# Patient Record
Sex: MALE | Race: WHITE | Employment: FULL TIME
[De-identification: names, ages, dates, MRNs, and addresses within clinical notes are randomized per-mention and may not be internally consistent; named-entity substitution may affect disease eponyms.]

---

## 2020-03-01 ENCOUNTER — HEALTH MAINTENANCE LETTER (OUTPATIENT)
Age: 50
End: 2020-03-01

## 2020-12-14 ENCOUNTER — HEALTH MAINTENANCE LETTER (OUTPATIENT)
Age: 50
End: 2020-12-14

## 2021-03-26 ENCOUNTER — IMMUNIZATION (OUTPATIENT)
Dept: FAMILY MEDICINE | Facility: OTHER | Age: 51
End: 2021-03-26
Payer: COMMERCIAL

## 2021-03-26 PROCEDURE — 91300 PR COVID VAC PFIZER DIL RECON 30 MCG/0.3 ML IM: CPT

## 2021-03-26 PROCEDURE — 0001A PR COVID VAC PFIZER DIL RECON 30 MCG/0.3 ML IM: CPT

## 2021-04-16 ENCOUNTER — IMMUNIZATION (OUTPATIENT)
Dept: FAMILY MEDICINE | Facility: OTHER | Age: 51
End: 2021-04-16
Attending: FAMILY MEDICINE
Payer: COMMERCIAL

## 2021-04-16 PROCEDURE — 91300 PR COVID VAC PFIZER DIL RECON 30 MCG/0.3 ML IM: CPT

## 2021-04-16 PROCEDURE — 0002A PR COVID VAC PFIZER DIL RECON 30 MCG/0.3 ML IM: CPT

## 2021-04-17 ENCOUNTER — HEALTH MAINTENANCE LETTER (OUTPATIENT)
Age: 51
End: 2021-04-17

## 2021-05-30 ENCOUNTER — HOSPITAL ENCOUNTER (EMERGENCY)
Facility: CLINIC | Age: 51
Discharge: HOME | End: 2021-05-30
Attending: EMERGENCY MEDICINE | Admitting: EMERGENCY MEDICINE
Payer: COMMERCIAL

## 2021-05-30 VITALS
BODY MASS INDEX: 26.67 KG/M2 | DIASTOLIC BLOOD PRESSURE: 83 MMHG | OXYGEN SATURATION: 97 % | SYSTOLIC BLOOD PRESSURE: 151 MMHG | HEART RATE: 82 BPM | WEIGHT: 210 LBS | TEMPERATURE: 99 F | RESPIRATION RATE: 22 BRPM

## 2021-05-30 DIAGNOSIS — N10 ACUTE PYELONEPHRITIS: ICD-10-CM

## 2021-05-30 LAB
ALBUMIN SERPL-MCNC: 3.9 G/DL (ref 3.4–5)
ALBUMIN UR-MCNC: NEGATIVE MG/DL
ALP SERPL-CCNC: 98 U/L (ref 40–150)
ALT SERPL W P-5'-P-CCNC: 49 U/L (ref 0–70)
ANION GAP SERPL CALCULATED.3IONS-SCNC: 8 MMOL/L (ref 3–14)
APPEARANCE UR: CLEAR
AST SERPL W P-5'-P-CCNC: 23 U/L (ref 0–45)
BACTERIA #/AREA URNS HPF: ABNORMAL /HPF
BASOPHILS # BLD AUTO: 0.1 10E9/L (ref 0–0.2)
BASOPHILS NFR BLD AUTO: 0.3 %
BILIRUB SERPL-MCNC: 0.8 MG/DL (ref 0.2–1.3)
BILIRUB UR QL STRIP: NEGATIVE
BUN SERPL-MCNC: 18 MG/DL (ref 7–30)
CALCIUM SERPL-MCNC: 9.4 MG/DL (ref 8.5–10.1)
CHLORIDE SERPL-SCNC: 104 MMOL/L (ref 94–109)
CO2 SERPL-SCNC: 25 MMOL/L (ref 20–32)
COLOR UR AUTO: ABNORMAL
CREAT SERPL-MCNC: 1.04 MG/DL (ref 0.66–1.25)
DIFFERENTIAL METHOD BLD: ABNORMAL
EOSINOPHIL # BLD AUTO: 0 10E9/L (ref 0–0.7)
EOSINOPHIL NFR BLD AUTO: 0.1 %
ERYTHROCYTE [DISTWIDTH] IN BLOOD BY AUTOMATED COUNT: 12.2 % (ref 10–15)
GFR SERPL CREATININE-BSD FRML MDRD: 83 ML/MIN/{1.73_M2}
GLUCOSE SERPL-MCNC: 133 MG/DL (ref 70–99)
GLUCOSE UR STRIP-MCNC: NEGATIVE MG/DL
HCT VFR BLD AUTO: 45.3 % (ref 40–53)
HGB BLD-MCNC: 15.2 G/DL (ref 13.3–17.7)
HGB UR QL STRIP: NEGATIVE
IMM GRANULOCYTES # BLD: 0.1 10E9/L (ref 0–0.4)
IMM GRANULOCYTES NFR BLD: 0.3 %
KETONES UR STRIP-MCNC: 20 MG/DL
LEUKOCYTE ESTERASE UR QL STRIP: ABNORMAL
LYMPHOCYTES # BLD AUTO: 2 10E9/L (ref 0.8–5.3)
LYMPHOCYTES NFR BLD AUTO: 11.9 %
MCH RBC QN AUTO: 28.7 PG (ref 26.5–33)
MCHC RBC AUTO-ENTMCNC: 33.6 G/DL (ref 31.5–36.5)
MCV RBC AUTO: 86 FL (ref 78–100)
MONOCYTES # BLD AUTO: 1.2 10E9/L (ref 0–1.3)
MONOCYTES NFR BLD AUTO: 6.8 %
MUCOUS THREADS #/AREA URNS LPF: PRESENT /LPF
NEUTROPHILS # BLD AUTO: 13.8 10E9/L (ref 1.6–8.3)
NEUTROPHILS NFR BLD AUTO: 80.6 %
NITRATE UR QL: POSITIVE
NRBC # BLD AUTO: 0 10*3/UL
NRBC BLD AUTO-RTO: 0 /100
PH UR STRIP: 5.5 PH (ref 5–7)
PLATELET # BLD AUTO: 421 10E9/L (ref 150–450)
POTASSIUM SERPL-SCNC: 3.8 MMOL/L (ref 3.4–5.3)
PROT SERPL-MCNC: 7.9 G/DL (ref 6.8–8.8)
RBC # BLD AUTO: 5.29 10E12/L (ref 4.4–5.9)
RBC #/AREA URNS AUTO: 1 /HPF (ref 0–2)
SODIUM SERPL-SCNC: 137 MMOL/L (ref 133–144)
SOURCE: ABNORMAL
SP GR UR STRIP: 1.02 (ref 1–1.03)
SQUAMOUS #/AREA URNS AUTO: <1 /HPF (ref 0–1)
UROBILINOGEN UR STRIP-MCNC: 0 MG/DL (ref 0–2)
WBC # BLD AUTO: 17.1 10E9/L (ref 4–11)
WBC #/AREA URNS AUTO: 54 /HPF (ref 0–5)

## 2021-05-30 PROCEDURE — 80053 COMPREHEN METABOLIC PANEL: CPT | Performed by: EMERGENCY MEDICINE

## 2021-05-30 PROCEDURE — 81001 URINALYSIS AUTO W/SCOPE: CPT | Performed by: EMERGENCY MEDICINE

## 2021-05-30 PROCEDURE — 87186 SC STD MICRODIL/AGAR DIL: CPT | Performed by: EMERGENCY MEDICINE

## 2021-05-30 PROCEDURE — 85025 COMPLETE CBC W/AUTO DIFF WBC: CPT | Performed by: EMERGENCY MEDICINE

## 2021-05-30 PROCEDURE — 99284 EMERGENCY DEPT VISIT MOD MDM: CPT | Mod: 25

## 2021-05-30 PROCEDURE — 87086 URINE CULTURE/COLONY COUNT: CPT | Performed by: EMERGENCY MEDICINE

## 2021-05-30 PROCEDURE — 87088 URINE BACTERIA CULTURE: CPT | Performed by: EMERGENCY MEDICINE

## 2021-05-30 PROCEDURE — 87040 BLOOD CULTURE FOR BACTERIA: CPT | Performed by: EMERGENCY MEDICINE

## 2021-05-30 PROCEDURE — 250N000011 HC RX IP 250 OP 636: Performed by: EMERGENCY MEDICINE

## 2021-05-30 PROCEDURE — 96365 THER/PROPH/DIAG IV INF INIT: CPT

## 2021-05-30 RX ORDER — CEFDINIR 300 MG/1
300 CAPSULE ORAL 2 TIMES DAILY
Qty: 20 CAPSULE | Refills: 0 | Status: SHIPPED | OUTPATIENT
Start: 2021-05-30 | End: 2021-06-09

## 2021-05-30 RX ORDER — CEFTRIAXONE 2 G/1
2 INJECTION, POWDER, FOR SOLUTION INTRAMUSCULAR; INTRAVENOUS ONCE
Status: COMPLETED | OUTPATIENT
Start: 2021-05-30 | End: 2021-05-30

## 2021-05-30 RX ADMIN — CEFTRIAXONE SODIUM 2 G: 2 INJECTION, POWDER, FOR SOLUTION INTRAMUSCULAR; INTRAVENOUS at 08:39

## 2021-05-30 ASSESSMENT — ENCOUNTER SYMPTOMS
BACK PAIN: 1
DIARRHEA: 1
HEMATURIA: 0
FEVER: 1
ABDOMINAL PAIN: 1
COUGH: 0
APPETITE CHANGE: 1
SHORTNESS OF BREATH: 0
VOMITING: 0

## 2021-05-30 NOTE — ED PROVIDER NOTES
"  History   Chief Complaint:  Dysuria     HPI   Al Coreas is a 50 year old male with history of type II diabetes mellitus and hypertension who presents with dysuria  intermittently for the past couple of months. He mentioned that urinary streams vary in flow. The patient reported having a fever (101 degrees) and \"racing heart\" this morning and took TheraFlu. The patient associated this with a decreased appetite, soreness in his hips and thighs, mild low abdominal pain, and lower back pain. Patient also mentioned he had diarrhea three weeks ago. Denies any vomiting, cough, shortness of breath, rashes, antibiotic use, hematuria. He is on Metformin and Trulicity with blood glucose ranging from 120-140. Patient mentioned prostate related history in his father's side but denies past history of bladder and kidney related issues.  He has never had his prostate evaluated, and does not know if he has BPH.  Last UTI was years ago.  No recent antibiotic use.  Patient is COVID-19 vaccinated.  He denies any other symptoms at this time.    Review of Systems   Constitutional: Positive for appetite change and fever.   Respiratory: Negative for cough and shortness of breath.    Gastrointestinal: Positive for abdominal pain and diarrhea. Negative for vomiting.   Genitourinary: Negative for hematuria.   Musculoskeletal: Positive for back pain.   Skin: Negative for rash.   All other systems reviewed and are negative.    Allergies:  The patient has no known allergies.     Medications:  Aspirin 81 mg  Lipitor  Vitamin B  Amaryl  Metformin  Elavil  Lisinopril  Trulicity  Insulin    Past Medical History:   Diabetes mellitus type II  Hypertension    Past Surgical History:    R Buttock and left medial thigh excision    Family History:    Diabetes  Stroke    Social History:  Patient presents with his wife.    Physical Exam     Patient Vitals for the past 24 hrs:   BP Temp Temp src Pulse Resp SpO2 Weight   05/30/21 0606 (!) 163/102 99 "  F (37.2  C) Oral 124 22 97 % 95.3 kg (210 lb)       Physical Exam  General: Well appearing, nontoxic. Resting comfortably  Head:  Scalp, face, and head appear normal  Eyes:  Pupils are equal, round    Conjunctivae non-injected and sclerae white  ENT:    The external nose is normal    Pinnae are normal  Neck:  Normal range of motion    There is no rigidity noted    Trachea is in the midline  CV:  Tachycardic rate, regular rhythm     Normal S1/S2, no S3/S4    No murmur or rub. Radial pulses 2+ bilaterally.  Resp:  Lungs are clear and equal bilaterally  There is no tachypnea    No increased work of breathing    No rales, wheezing, or rhonchi  GI:  Abdomen is soft, no rigidity or guarding    No distension, or mass. No CVA tenderness    No tenderness or rebound tenderness   MS:  Normal muscular tone    Symmetric motor strength    No lower extremity edema  Skin:  No rash or acute skin lesions noted  Neuro: Awake and alert  Speech is normal and fluent  Moves all extremities spontaneously  Psych:  Normal affect. Appropriate interactions.      Emergency Department Course   Laboratory:  CBC: WBC 17.1 (H), HGB 15.2,   CMP: Glucose: 133 o/w WNL (Creatinine 1.04)   UA with microscopic: Ketone: 20, Nitrite: Positive, Leukocyte Esterase: Large, WBC: 54, Bacteria: Few, Mucous: Present o/w WNL  Urine Culture: pending  Blood culture x2: pending    Emergency Department Course:    Reviewed:  I reviewed nursing notes, vitals, past medical history and care everywhere    Assessments:  0615 I obtained history and examined the patient as noted above.   0852 I rechecked the patient and explained findings.     Interventions:  0839 Rocephin 2g IV    Disposition:  The patient was discharged to home.     Impression & Plan     Medical Decision Making:  Al Coreas is a 50 year old male who presents for evaluation of fever, dysuria and low back and lower abdominal discomfort.  Urinalysis is consistent with a urinary tract infection;  given the systemic symptoms present, signs and symptoms consistent with pyelonephritis.   There is no clinical evidence of perinephric abscess, emphysematous pyelonephritis, ureterolithiasis, appendicitis, colitis, diverticulitis or any intraabdominal catastrophe.  Patient was given dose of antibiotics in ED; see above.  Patient also reports symptoms likely consistent with possible BPH and/or urinary retention.  Bladder scan in the ED showed no significant post void bladder retention. Given his otherwise well appearance, lack of significant co-morbidities and ability to tolerate po, I believe the risk of imminent deterioration is low enough that outpatient management is indicated.  Return if increasing pain, vomiting, fever, or inability to tolerate the oral antibiotic.  Follow up with primary physician is indicated in 2-3. Return precautions were discussed with patient. The patient's questions were answered and the patient was agreeable with discharge. Cefdinir BID x 10 days.    Covid-19  Al Coreas was evaluated during a global COVID-19 pandemic, which necessitated consideration that the patient might be at risk for infection with the SARS-CoV-2 virus that causes COVID-19.   Applicable protocols for evaluation were followed during the patient's care.     Diagnosis:    ICD-10-CM    1. Acute pyelonephritis  N10        Discharge Medications:  New Prescriptions    CEFDINIR (OMNICEF) 300 MG CAPSULE    Take 1 capsule (300 mg) by mouth 2 times daily for 10 days       Scribe Disclosure:  Lizy SOTOMAYOR, am serving as a scribe at 6:15 AM on 5/30/2021 to document services personally performed by Stephen Arceo MD based on my observations and the provider's statements to me.      Stephen Arceo MD  05/30/21 8280

## 2021-05-31 LAB
BACTERIA SPEC CULT: ABNORMAL
Lab: ABNORMAL
SPECIMEN SOURCE: ABNORMAL

## 2021-06-05 LAB
BACTERIA SPEC CULT: NO GROWTH
BACTERIA SPEC CULT: NO GROWTH
SPECIMEN SOURCE: NORMAL
SPECIMEN SOURCE: NORMAL

## 2021-06-08 ENCOUNTER — TELEPHONE (OUTPATIENT)
Dept: EMERGENCY MEDICINE | Facility: CLINIC | Age: 51
End: 2021-06-08

## 2021-08-07 ENCOUNTER — HEALTH MAINTENANCE LETTER (OUTPATIENT)
Age: 51
End: 2021-08-07

## 2021-10-02 ENCOUNTER — HEALTH MAINTENANCE LETTER (OUTPATIENT)
Age: 51
End: 2021-10-02

## 2022-03-19 ENCOUNTER — HEALTH MAINTENANCE LETTER (OUTPATIENT)
Age: 52
End: 2022-03-19

## 2022-05-14 ENCOUNTER — HEALTH MAINTENANCE LETTER (OUTPATIENT)
Age: 52
End: 2022-05-14

## 2022-09-03 ENCOUNTER — HEALTH MAINTENANCE LETTER (OUTPATIENT)
Age: 52
End: 2022-09-03

## 2023-01-14 ENCOUNTER — HEALTH MAINTENANCE LETTER (OUTPATIENT)
Age: 53
End: 2023-01-14

## 2023-06-02 ENCOUNTER — HEALTH MAINTENANCE LETTER (OUTPATIENT)
Age: 53
End: 2023-06-02

## 2023-07-22 ENCOUNTER — HEALTH MAINTENANCE LETTER (OUTPATIENT)
Age: 53
End: 2023-07-22

## 2024-02-17 ENCOUNTER — HEALTH MAINTENANCE LETTER (OUTPATIENT)
Age: 54
End: 2024-02-17

## 2025-03-13 ENCOUNTER — HOSPITAL ENCOUNTER (EMERGENCY)
Facility: CLINIC | Age: 55
Discharge: HOME | End: 2025-03-13
Attending: EMERGENCY MEDICINE
Payer: COMMERCIAL

## 2025-03-13 VITALS
SYSTOLIC BLOOD PRESSURE: 178 MMHG | RESPIRATION RATE: 16 BRPM | TEMPERATURE: 98.2 F | DIASTOLIC BLOOD PRESSURE: 76 MMHG | HEART RATE: 77 BPM | OXYGEN SATURATION: 98 %

## 2025-03-13 DIAGNOSIS — E11.65 TYPE 2 DIABETES MELLITUS WITH HYPERGLYCEMIA, WITH LONG-TERM CURRENT USE OF INSULIN (H): ICD-10-CM

## 2025-03-13 DIAGNOSIS — L02.416 ABSCESS OF LEFT LEG: ICD-10-CM

## 2025-03-13 DIAGNOSIS — Z79.4 TYPE 2 DIABETES MELLITUS WITH HYPERGLYCEMIA, WITH LONG-TERM CURRENT USE OF INSULIN (H): ICD-10-CM

## 2025-03-13 PROCEDURE — 10060 I&D ABSCESS SIMPLE/SINGLE: CPT

## 2025-03-13 PROCEDURE — 99283 EMERGENCY DEPT VISIT LOW MDM: CPT

## 2025-03-13 PROCEDURE — 87070 CULTURE OTHR SPECIMN AEROBIC: CPT | Performed by: EMERGENCY MEDICINE

## 2025-03-13 PROCEDURE — 250N000013 HC RX MED GY IP 250 OP 250 PS 637: Performed by: EMERGENCY MEDICINE

## 2025-03-13 RX ORDER — CEPHALEXIN 500 MG/1
1000 CAPSULE ORAL ONCE
Status: COMPLETED | OUTPATIENT
Start: 2025-03-13 | End: 2025-03-13

## 2025-03-13 RX ADMIN — CEPHALEXIN 1000 MG: 500 CAPSULE ORAL at 11:37

## 2025-03-13 ASSESSMENT — ACTIVITIES OF DAILY LIVING (ADL)
ADLS_ACUITY_SCORE: 41

## 2025-03-13 ASSESSMENT — COLUMBIA-SUICIDE SEVERITY RATING SCALE - C-SSRS
1. IN THE PAST MONTH, HAVE YOU WISHED YOU WERE DEAD OR WISHED YOU COULD GO TO SLEEP AND NOT WAKE UP?: NO
6. HAVE YOU EVER DONE ANYTHING, STARTED TO DO ANYTHING, OR PREPARED TO DO ANYTHING TO END YOUR LIFE?: NO
2. HAVE YOU ACTUALLY HAD ANY THOUGHTS OF KILLING YOURSELF IN THE PAST MONTH?: NO

## 2025-03-13 NOTE — ED PROVIDER NOTES
Emergency Department Note      History of Present Illness     Chief Complaint   Wound Check    HPI   Al Coreas is a 54 year old male with a history of diabetes mellitus presenting with a cyst to the left leg that was first noticed one week ago. Al poked it with a needle this morning, noting it drained some plasma and blood. The size of the wound has improved since draining. He presented to the clinic this morning and was prescribed cephalexin, but he has not picked it up yet. No recent ill symptoms, fever or vomiting. No history of MRSA. Of note, the patient has a history of cysts requiring surgical removal. His BG have been mildly elevated recently, which he has been managing with increased insulin. Last BG check was 140 today. He has a history of DKA, but is not concerned for this today. No polyuria or polydipsia.     Independent Historian   Wife as detailed above.    Review of External Notes   none    Past Medical History     Medical History and Problem List   Abscess of buttock  Diabetes mellitus     Medications   Aspirin 81 mg   Atorvastatin   Glimepiride   Metformin     Surgical History   Left hand surgery     Physical Exam     Patient Vitals for the past 24 hrs:   BP Temp Temp src Pulse Resp SpO2   03/13/25 0947 (!) 178/76 98.2  F (36.8  C) Oral 77 16 98 %     Physical Exam  Nursing note and vitals reviewed.  Constitutional:  Appears well-developed and well-nourished.   HENT:   Head:    Atraumatic.   Eyes:    Pupils are equal, round, and reactive to light.   Neck:    Normal range of motion. Neck supple.      No tracheal deviation present. No thyromegaly present.   Cardiovascular:  Normal rate, regular rhythm, no murmur   Pulmonary/Chest: Breath sounds are clear and equal without wheezes or crackles.  Abdominal:   Soft. Bowel sounds are normal. Exhibits no distension and      no mass. There is no tenderness.      There is no rebound and no guarding.   Musculoskeletal:  Exhibits no edema.    Neurological:   Alert and oriented to person, place, and time.   Skin:    Localized 4.0 cm diameter area of redness, swelling and fluctuants with minimal tenderness over the lateral aspect of the mid thigh without warm. No lymphangitis.      Diagnostics     Lab Results   Labs Ordered and Resulted from Time of ED Arrival to Time of ED Departure - No data to display    Imaging   No orders to display     Independent Interpretation   None    ED Course      Medications Administered   Medications   cephALEXin (KEFLEX) capsule 1,000 mg (1,000 mg Oral $Given 3/13/25 1137)       Procedures   Procedures     Incision and Drainage     Procedure: Incision and Drainage     Consent: Verbal    Indication: Abscess    Location: Extremity, lower left    Size: 4 cm    Ultrasound Guidance: No    Preparation: Alcohol     Anesthesia/Sedation: Bupivacaine with Epinephrine - 0.5%     Procedure Detail:    Aspiration: No  Incision Type: Single straight  Scalpel: 11  Lesion Management: Moderate amount of bloody pus was expressed from the wound which was deloculated and probed.  Wound Management: Packing   Packing: Gauze, 1/2 inch     Patient Status: The patient tolerated the procedure well: Yes. There were no complications.     Discussion of Management   None    ED Course   ED Course as of 03/13/25 1224   Thu Mar 13, 2025   1005 I obtained the history and examined the patient as noted above.      1202 I reentered the room to perform an incision and drainage.      Additional Documentation  None    Medical Decision Making / Diagnosis     CMS Diagnoses: None    MIPS       None    MDM   Al Coreas is a 54 year old male who presents with left leg abscess with history provided above. He has signs of an abscess. I&D performed and successfully expressed purulent drainage; see procedure note.  Packing was placed.  No signs of more severe infection such as sepsis, necrotizing fasciitis or lymphangitis.  Wound cares discussed.  Follow up in  clinic or may return to ED for wound check if cannot arrange.  Antibiotics were prescribed to prevent potential infection.  Warning signs for wound given on discharge instructions and verbally. The patient's BG have been mildly elevated recently since he began with abscess, which he has been treating with increased insulin. No suspicion for DKA during today's visit.  The patient requested a referral to general surgery which I felt was reasonable, so I ordered a surgical referral in [facility].  He was told signs and symptoms of worsening infection to return to the emergency department for.  He was told to remove the packing in 2 days and follow-up with his primary care doctor early next week    Disposition   The patient was discharged.     Diagnosis     ICD-10-CM    1. Abscess of left leg  L02.416 Adult Gen Surg  Referral      2. Type 2 diabetes mellitus with hyperglycemia, with long-term current use of insulin (H)  E11.65     Z79.4          Discharge Medications   New Prescriptions    No medications on file     Scribe Disclosure:  Marilyn SOTOMAYOR, am serving as a scribe at 10:20 AM on 3/13/2025 to document services personally performed by Sandy Jerry MD based on my observations and the provider's statements to me.        Sandy Jerry MD  03/13/25 5056       Sandy Jerry MD  03/13/25 3192

## 2025-03-13 NOTE — ED TRIAGE NOTES
Pt comes in with cyst to back of left leg. States has been there for a week. Tried to drain it on his own. Denies fevers.      Triage Assessment (Adult)       Row Name 03/13/25 0948          Triage Assessment    Airway WDL WDL        Respiratory WDL    Respiratory WDL WDL        Skin Circulation/Temperature WDL    Skin Circulation/Temperature WDL --  wound to back of left leg        Cardiac WDL    Cardiac WDL WDL        Peripheral/Neurovascular WDL    Peripheral Neurovascular WDL WDL        Cognitive/Neuro/Behavioral WDL    Cognitive/Neuro/Behavioral WDL WDL

## 2025-03-15 LAB — BACTERIA ABSC ANAEROBE+AEROBE CULT: NO GROWTH

## 2025-03-30 ENCOUNTER — HEALTH MAINTENANCE LETTER (OUTPATIENT)
Age: 55
End: 2025-03-30

## 2025-06-18 ENCOUNTER — HOSPITAL ENCOUNTER (EMERGENCY)
Facility: CLINIC | Age: 55
Discharge: HOME | End: 2025-06-18
Attending: EMERGENCY MEDICINE | Admitting: EMERGENCY MEDICINE
Payer: COMMERCIAL

## 2025-06-18 VITALS
TEMPERATURE: 97.7 F | HEART RATE: 72 BPM | SYSTOLIC BLOOD PRESSURE: 166 MMHG | DIASTOLIC BLOOD PRESSURE: 91 MMHG | RESPIRATION RATE: 18 BRPM | OXYGEN SATURATION: 98 %

## 2025-06-18 DIAGNOSIS — T14.8XXA FOREIGN BODY IN SKIN: ICD-10-CM

## 2025-06-18 PROCEDURE — 99283 EMERGENCY DEPT VISIT LOW MDM: CPT

## 2025-06-18 ASSESSMENT — ACTIVITIES OF DAILY LIVING (ADL): ADLS_ACUITY_SCORE: 41

## 2025-06-18 NOTE — ED PROVIDER NOTES
Emergency Department Note      History of Present Illness     Chief Complaint   Foreign Body in Arm      HPI   Al Coreas is a 54 year old male with a history of type 2 diabetes mellitus, hypertension, hyperlipidemia, and MORGAN who presents for evaluation for of possible a foreign body in his left upper arm.  The patient uses the Dexcom to measure his blood sugars, and he was placed in the new device in his left upper arm today when he had apparently was not working properly.  He then removed to the device, and he did not see the needle on the device.  He was therefore concerned that the needle was still in his arm, and his wife tried to remove the needle but does not believe that she was able to.  He is therefore unsure if there is still a needle in his arm, as he does not have any significant pain.     Independent Historian   Wife as detailed above.    Review of External Notes   I reviewed the patient's MIIC.    Past Medical History     Medical History and Problem List   Type 2 diabetes mellitus  MORGAN  Hyperlipidemia  Hypertension    Medications   amoxicillin-clavulanate (AUGMENTIN) 875-125 MG per tablet  aspirin 81 MG tablet  atorvastatin (LIPITOR) 40 MG tablet  B Complex Vitamins (B COMPLEX 1 PO)  blood glucose (ACCU-CHEK FASTCLIX) lancing device  blood glucose monitoring (ACCU-CHEK FASTCLIX) lancets  blood glucose monitoring (ACCU-CHEK ANGELES SMARTVIEW) meter device kit  blood glucose test strip  glimepiride (AMARYL) 2 MG tablet  glucose blood VI test strips strip  metFORMIN (GLUCOPHAGE) 1000 MG tablet  Omega-3 Fatty Acids (FISH OIL TRIPLE STRENGTH) 1400 MG CAPS        Surgical History   Past Surgical History:   Procedure Laterality Date    left hand surgery  1994    electrical injury        Physical Exam     Patient Vitals for the past 24 hrs:   BP Temp Temp src Pulse Resp SpO2   06/18/25 1121 (!) 166/91 97.7  F (36.5  C) Temporal 72 18 98 %     Physical Exam  Nursing note and vitals  reviewed.  Constitutional:  Oriented to person, place, and time. Cooperative.   HENT:   Nose:    Nose normal.   Mouth/Throat:   Mucous membranes are normal.   Eyes:    Conjunctivae normal and EOM are normal.      Pupils are equal, round, and reactive to light.   Neck:    Trachea normal.   Cardiovascular:  Normal rate, regular rhythm, normal heart sounds and normal pulses. No murmur heard.  Pulmonary/Chest:  Effort normal and breath sounds normal.   Abdominal:   Soft. Normal appearance and bowel sounds are normal.      There is no tenderness.      There is no rebound and no CVA tenderness.   Musculoskeletal:  Extremities atraumatic x 4.   Neurological:   Alert and oriented to person, place, and time. Normal strength.      No cranial nerve deficit or sensory deficit. GCS eye subscore is 4. GCS verbal subscore is 5. GCS motor subscore is 6.   Skin:    There is a very small pinpoint erythematous lesion to the left upper arm region but no bleeding or foreign body appreciated on exam and no tenderness to this area.   Psychiatric:   Normal mood and affect.     Diagnostics     Lab Results   Labs Ordered and Resulted from Time of ED Arrival to Time of ED Departure - No data to display    Imaging   Humerus XR,  G/E 2 views, left   Final Result   IMPRESSION:       No metallic foreign body is evident. Negative for acute left humerus fracture or dislocation.          Independent Interpretation   I independently interpreted the patient's humerus x-rays, and I do not appreciate any foreign bodies present.    ED Course      Medications Administered   Medications - No data to display    Procedures   Procedures     Discussion of Management   None    ED Course   ED Course as of 06/18/25 1346   Wed Jun 18, 2025   1154 I evaluated the patient and obtained the history as noted above     1213 I rechecked and reupdated the patient.         Additional Documentation  None    Medical Decision Making / Diagnosis     CMS Diagnoses: None    MIPS    None    [facility]   Al Coreas is a 54 year old male who came in concerned that he might have a Dexcom needle still in his left upper arm.  I obtained x-rays, which did not show a foreign body.  The radiologist called me as well, as he does not see a foreign body either.  I reassured the patient that it does not appear that there is a foreign body in his arm, although I also indicated that it is still possible, albeit unlikely, that there could be something still there that we are not seeing on the x-ray.  However I think that is unlikely given the fact that a needle should show up on x-ray.  I recommended that he use warm wet compresses to the area and certainly return if he develops increasing pain, redness, swelling, or other concerns.  If he has any ongoing concerns for a retained foreign body after a few days, he should also seek reevaluation either here or in his clinic.    Disposition   The patient was discharged.     Diagnosis     ICD-10-CM    1. Evaluation for possible foreign body in skin of left upper arm  T14.8XXA            Discharge Medications   Discharge Medication List as of 6/18/2025 12:46 PM            Scribe Disclosure:  I, Kiko Guzmán, am serving as a scribe at 1:45 PM on 6/18/2025 to document services personally performed by No att. providers found based on my observations and the provider's statements to me.        Tong Hamilton MD  06/18/25 9740

## 2025-06-18 NOTE — ED NOTES
Pt to D/C to home.  Pt provided with d/c instructions, including new medications, when medications were last given, and when to take them again.  Pt also informed to f/u with PCP or to come back to the ER as needed.  Pt verbalized understanding of all d/c and f/u instructions.  All questions were answered at this time.  Copy of paperwork sent with pt.